# Patient Record
(demographics unavailable — no encounter records)

---

## 2025-03-24 NOTE — DISCUSSION/SUMMARY
[FreeTextEntry1] : Rule out ADHD +/- LD. Will get EEG, URIEL and Neuropsych evaluation. RTO prn. Note sent to Dr Gaspar(PCP). Total clinician time spent on 3/24/2025 is 49 minutes including preparing to see the patient, obtaining and/or reviewing and confirming history, performing a medically necessary and appropriate examination, counseling and educating the patient and/or family, documenting clinical information in the EHR and communicating and/or referring to other healthcare professionals.

## 2025-03-24 NOTE — CONSULT LETTER
[Dear  ___] : Dear  [unfilled], [Please see my note below.] : Please see my note below. [Sincerely,] : Sincerely, [FreeTextEntry1] : Thank you for sending  NORA GUIDRY  to me for neurological evaluation. This is an initial encounter with a new pt. [FreeTextEntry3] : Dr Lara

## 2025-03-24 NOTE — HISTORY OF PRESENT ILLNESS
[FreeTextEntry1] : 9 year old male with focusing concerns, distractibilty, difficulty staying still and staying on task. Currently in a regular 3rd grade class. Walked and talked on time. Birth: FT C/S no complications. FMH +ve for epilepsy in an uncle. No FMH of ASD. PMH -ve. On no meds. NKA.

## 2025-03-24 NOTE — PHYSICAL EXAM
[FreeTextEntry1] : Alert, NAD. Heart sounds NL. Neck FROM. PERRL, EOMI, face symmetric, hearing intact. Tone, power, gait NL. No nystagmus or tremor.

## 2025-05-05 NOTE — DISCUSSION/SUMMARY
[FreeTextEntry1] : In summary, NORA is a 9-year-old male here for dyslipidemia. His physical exam is normal. His EKG shows sinus rhythm with PVCs, first degree AV block and borderline QTc prolongation. His echocardiogram shows normal intracardiac anatomy with good biventricular systolic function and no effusion. Given these results and his clinical presentation, I provided reassurance and explained that NORA has a structurally normal heart.   During this visit we discussed his abnormal EKG. I explained PVCs can have a benign course in this age group and can resolve over time.  They can be idiopathic.  On the other hand, we discussed some other causes of PVCs, including infections and myocarditis (we discussed the possibility given recent URI symptoms), autoimmune issues, thyroid issues, electrolyte abnormalities, underlying channelopathies (although no clear family history of sudden cardiac death or rhythm issues aside from dad having palpitations).  We discussed the need for further testing.  I am sending blood work today including testing for myocarditis and screening for autoimmune issues as well as electrolyte abnormalities and rechecking thyroid function.  We discussed that until blood work is back and there is a low suspicion for myocarditis, Nora should refrain from sports given the small increased risk of sudden cardiac events in the setting of myocarditis, the risk of which can persist for months after acute myocarditis.  If his blood work and Holter are generally reassuring, I still anticipate the need for reevaluation in 1 year, sooner if any new concerns or symptoms.  Given that he is asymptomatic, if this is a longstanding issue for him, and if his PVCs have a generally low burden, I do not anticipate the need for medications such as beta-blockers.  With regard to his dyslipidemia, we discussed the importance of lifestyle interventions and avoiding unhealthy foods that contain saturated fats/trans fats.  We discussed the need for occasional surveillance.  Based on his most recent lipid panel, he does not meet criteria for statin.    Plan: - F/u labs - F/u Holter - No activity restrictions. - No SBE prophylaxis.     Please do not hesitate to contact me if you have any questions.   Rashaad Walton MD, MS, FAAP, FACC Attending Physician, Pediatric Cardiology [facility] Physician [address], [address] [place] [address] Office: [phone] Fax: [phone] Email: [email or web address]     I have spent 60 minutes of time on the encounter excluding separately reported services.

## 2025-05-05 NOTE — REASON FOR VISIT
[Initial Consultation] : an initial consultation for [Lipid Disorder] : lipid disorder [FreeTextEntry3] : Family history

## 2025-05-05 NOTE — HISTORY OF PRESENT ILLNESS
[FreeTextEntry1] : Dear Dr. FREDI PIERRE,   I had the pleasure of seeing your patient, NORA GUIDRY, in my office today, 05/05/2025. As you know, he is a 9 year old male referred to pediatric cardiology due to elevated cholesterol, family history.   Nora has a history of dyslipidemia.  His most recent lipid panel in March 2025 showed  with total cholesterol 218; otherwise normal/reassuring labs--see scanned report from [facility].  He has occasional chest discomfort particularly when running.  Otherwise there is no history of SOB, headaches, vision changes, dizziness, or syncope. +Recent URI symptoms -- last week. No family history of congenital heart disease or sudden/unexplained death. + Multiple family members with dyslipidemias eventually requiring open heart surgeries (limited details available).  Father has palpitations and is undergoing a cardiac workup.

## 2025-05-05 NOTE — FAMILY HISTORY
[TextEntry] : + Multiple family members with dyslipidemias eventually requiring open heart surgeries (limited details available).  Father has palpitations and is undergoing a cardiac workup.

## 2025-05-05 NOTE — CARDIOLOGY SUMMARY
[Today's Date] : [unfilled] [FreeTextEntry1] : Sinus rhythm with frequent PVCs. Borderline first degree AV delay (-200ms) Borderline prolonged -465 ms [FreeTextEntry2] : Normal segmental anatomy, normally-related great vessels. No septal defects or PDA. No significant valvar regurgitation (trivial, physiologic TR/PI), stenosis, or outflow obstruction. No ventricular hypertrophy. Normal biventricular function. Normal origins of the coronary arteries. Normal aortic arch and descending aortic Doppler tracing. No significant pericardial effusion. Ectopy throughout study.